# Patient Record
Sex: MALE | URBAN - METROPOLITAN AREA
[De-identification: names, ages, dates, MRNs, and addresses within clinical notes are randomized per-mention and may not be internally consistent; named-entity substitution may affect disease eponyms.]

---

## 2018-05-30 ENCOUNTER — APPOINTMENT (OUTPATIENT)
Dept: URBAN - METROPOLITAN AREA CLINIC 293 | Age: 33
Setting detail: DERMATOLOGY
End: 2018-05-31

## 2018-05-30 DIAGNOSIS — Z71.89 OTHER SPECIFIED COUNSELING: ICD-10-CM

## 2018-05-30 DIAGNOSIS — B35.1 TINEA UNGUIUM: ICD-10-CM

## 2018-05-30 PROCEDURE — OTHER ORDER TESTS: OTHER

## 2018-05-30 PROCEDURE — OTHER NAIL CLIPPING FOR PATHOLOGY: OTHER

## 2018-05-30 PROCEDURE — OTHER PRESCRIPTION: OTHER

## 2018-05-30 PROCEDURE — OTHER COUNSELING: OTHER

## 2018-05-30 PROCEDURE — 99202 OFFICE O/P NEW SF 15 MIN: CPT

## 2018-05-30 PROCEDURE — OTHER MIPS QUALITY: OTHER

## 2018-05-30 RX ORDER — CICLOPIROX 80 MG/ML
SOLUTION TOPICAL QHS
Qty: 1 | Refills: 11 | Status: ERX | COMMUNITY
Start: 2018-05-30

## 2018-05-30 ASSESSMENT — LOCATION SIMPLE DESCRIPTION DERM
LOCATION SIMPLE: RIGHT GREAT TOE
LOCATION SIMPLE: LEFT THUMB

## 2018-05-30 ASSESSMENT — LOCATION DETAILED DESCRIPTION DERM
LOCATION DETAILED: LEFT THUMBNAIL
LOCATION DETAILED: RIGHT GREAT TOENAIL

## 2018-05-30 ASSESSMENT — LOCATION ZONE DERM
LOCATION ZONE: TOENAIL
LOCATION ZONE: FINGERNAIL

## 2018-05-30 NOTE — PROCEDURE: MIPS QUALITY
Quality 110: Preventive Care And Screening: Influenza Immunization: Influenza Immunization not Administered for Documented Reasons.
Quality 130: Documentation Of Current Medications In The Medical Record: Current Medications Documented
Detail Level: Detailed
Quality 226: Preventive Care And Screening: Tobacco Use: Screening And Cessation Intervention: Patient screened for tobacco and is a smoker AND received Cessation Counseling